# Patient Record
Sex: MALE | Race: OTHER | HISPANIC OR LATINO | Employment: UNEMPLOYED | URBAN - METROPOLITAN AREA
[De-identification: names, ages, dates, MRNs, and addresses within clinical notes are randomized per-mention and may not be internally consistent; named-entity substitution may affect disease eponyms.]

---

## 2021-02-16 ENCOUNTER — APPOINTMENT (EMERGENCY)
Dept: RADIOLOGY | Facility: HOSPITAL | Age: 31
End: 2021-02-16
Payer: COMMERCIAL

## 2021-02-16 ENCOUNTER — HOSPITAL ENCOUNTER (EMERGENCY)
Facility: HOSPITAL | Age: 31
Discharge: HOME | End: 2021-02-16
Attending: EMERGENCY MEDICINE | Admitting: EMERGENCY MEDICINE
Payer: COMMERCIAL

## 2021-02-16 VITALS
DIASTOLIC BLOOD PRESSURE: 63 MMHG | OXYGEN SATURATION: 100 % | WEIGHT: 134 LBS | TEMPERATURE: 98.2 F | HEART RATE: 65 BPM | RESPIRATION RATE: 17 BRPM | SYSTOLIC BLOOD PRESSURE: 123 MMHG

## 2021-02-16 DIAGNOSIS — R07.89 ATYPICAL CHEST PAIN: ICD-10-CM

## 2021-02-16 DIAGNOSIS — R53.83 FATIGUE: Primary | ICD-10-CM

## 2021-02-16 LAB
ANION GAP SERPL CALCULATED.3IONS-SCNC: 6 MMOL/L (ref 4–13)
ATRIAL RATE: 56 BPM
ATRIAL RATE: 66 BPM
BASOPHILS # BLD AUTO: 0.02 THOUSANDS/ΜL (ref 0–0.1)
BASOPHILS NFR BLD AUTO: 0 % (ref 0–1)
BUN SERPL-MCNC: 12 MG/DL (ref 5–25)
CALCIUM SERPL-MCNC: 9.7 MG/DL (ref 8.3–10.1)
CHLORIDE SERPL-SCNC: 104 MMOL/L (ref 100–108)
CO2 SERPL-SCNC: 30 MMOL/L (ref 21–32)
CREAT SERPL-MCNC: 1.23 MG/DL (ref 0.6–1.3)
EOSINOPHIL # BLD AUTO: 0.07 THOUSAND/ΜL (ref 0–0.61)
EOSINOPHIL NFR BLD AUTO: 1 % (ref 0–6)
ERYTHROCYTE [DISTWIDTH] IN BLOOD BY AUTOMATED COUNT: 12.4 % (ref 11.6–15.1)
GFR SERPL CREATININE-BSD FRML MDRD: 78 ML/MIN/1.73SQ M
GLUCOSE SERPL-MCNC: 86 MG/DL (ref 65–140)
HCT VFR BLD AUTO: 44.7 % (ref 36.5–49.3)
HGB BLD-MCNC: 14.5 G/DL (ref 12–17)
IMM GRANULOCYTES # BLD AUTO: 0.01 THOUSAND/UL (ref 0–0.2)
IMM GRANULOCYTES NFR BLD AUTO: 0 % (ref 0–2)
LYMPHOCYTES # BLD AUTO: 2.44 THOUSANDS/ΜL (ref 0.6–4.47)
LYMPHOCYTES NFR BLD AUTO: 47 % (ref 14–44)
MCH RBC QN AUTO: 28.7 PG (ref 26.8–34.3)
MCHC RBC AUTO-ENTMCNC: 32.4 G/DL (ref 31.4–37.4)
MCV RBC AUTO: 89 FL (ref 82–98)
MONOCYTES # BLD AUTO: 0.54 THOUSAND/ΜL (ref 0.17–1.22)
MONOCYTES NFR BLD AUTO: 10 % (ref 4–12)
NEUTROPHILS # BLD AUTO: 2.22 THOUSANDS/ΜL (ref 1.85–7.62)
NEUTS SEG NFR BLD AUTO: 42 % (ref 43–75)
NRBC BLD AUTO-RTO: 0 /100 WBCS
P AXIS: 63 DEGREES
P AXIS: 71 DEGREES
PLATELET # BLD AUTO: 234 THOUSANDS/UL (ref 149–390)
PMV BLD AUTO: 10.1 FL (ref 8.9–12.7)
POTASSIUM SERPL-SCNC: 4.2 MMOL/L (ref 3.5–5.3)
PR INTERVAL: 162 MS
PR INTERVAL: 170 MS
QRS AXIS: 82 DEGREES
QRS AXIS: 83 DEGREES
QRSD INTERVAL: 92 MS
QRSD INTERVAL: 96 MS
QT INTERVAL: 384 MS
QT INTERVAL: 398 MS
QTC INTERVAL: 384 MS
QTC INTERVAL: 402 MS
RBC # BLD AUTO: 5.05 MILLION/UL (ref 3.88–5.62)
SODIUM SERPL-SCNC: 140 MMOL/L (ref 136–145)
T WAVE AXIS: 69 DEGREES
T WAVE AXIS: 69 DEGREES
TROPONIN I SERPL-MCNC: <0.02 NG/ML
TSH SERPL DL<=0.05 MIU/L-ACNC: 0.68 UIU/ML (ref 0.36–3.74)
VENTRICULAR RATE: 56 BPM
VENTRICULAR RATE: 66 BPM
WBC # BLD AUTO: 5.3 THOUSAND/UL (ref 4.31–10.16)

## 2021-02-16 PROCEDURE — 80048 BASIC METABOLIC PNL TOTAL CA: CPT | Performed by: EMERGENCY MEDICINE

## 2021-02-16 PROCEDURE — 84443 ASSAY THYROID STIM HORMONE: CPT | Performed by: EMERGENCY MEDICINE

## 2021-02-16 PROCEDURE — 84484 ASSAY OF TROPONIN QUANT: CPT | Performed by: EMERGENCY MEDICINE

## 2021-02-16 PROCEDURE — 93005 ELECTROCARDIOGRAM TRACING: CPT

## 2021-02-16 PROCEDURE — 93010 ELECTROCARDIOGRAM REPORT: CPT | Performed by: INTERNAL MEDICINE

## 2021-02-16 PROCEDURE — 99284 EMERGENCY DEPT VISIT MOD MDM: CPT

## 2021-02-16 PROCEDURE — 85025 COMPLETE CBC W/AUTO DIFF WBC: CPT | Performed by: EMERGENCY MEDICINE

## 2021-02-16 PROCEDURE — 71046 X-RAY EXAM CHEST 2 VIEWS: CPT

## 2021-02-16 PROCEDURE — 36415 COLL VENOUS BLD VENIPUNCTURE: CPT | Performed by: EMERGENCY MEDICINE

## 2021-02-16 PROCEDURE — 99285 EMERGENCY DEPT VISIT HI MDM: CPT | Performed by: EMERGENCY MEDICINE

## 2021-02-16 NOTE — DISCHARGE INSTRUCTIONS
The cause of your symptoms is unclear, but unlikely to be an emergency at this time  It is important that you follow up as instructed and watch for any changes in your condition       You should return to the nearest emergency department, if you develop different type of chest pain, chest pain that is occurring more frequently, become much more short of breath when you lay flat, or are concerned about anything else

## 2021-02-16 NOTE — ED PROVIDER NOTES
Emergency Department Note- Rj Rojas 31 y o  male MRN: [id number]    Unit/Bed#: ED 27 Encounter: [id number]        History of Present Illness     Patient is a 48-year-old male accompanied by his mother  On February 5th the patient noticed he had some unusual sensations in his chest, predominantly left-hand side, felt like a bit of a pressure at times, then felt like something was moving around a little bit  Also began having some generalized feeling of being more fatigued and tired, having a little bit difficulty concentrating  Also felt like some occasional nausea but no actual vomiting  Also feeling a little bit short of breath at times but not consistently  No cough  At that point there was no travel history, no sick contacts, no recent hospitalizations or immobilizations  Some occasional loose stool but not consistently  Also some occasional difficulty stooling but not consistently  He had no fever, no chills, no focal weakness  He traveled to hospitals 3 days later, was in [facility] for 3 days, and then returned  He says while he was in [facility] and after returning his symptoms have continued, not really getting worse but not really getting better  He says he thinks he may have lost a little bit weight but does not weigh himself but thinks he looks a little bit thinner in the mirror  Four days ago he went to an emergency department in [place], was told that blood work and chest x-ray was unremarkable  COVID testing was negative  He presents today because his symptoms are still continuing and he wants to know what is wrong with him  He does not have a primary care physician however made an appointment with a new primary care physician to be seen tomorrow      Family history is negative for coronary disease, DVT, PE and connective tissue disorders such as Marfan's    REVIEW OF SYSTEMS     Constitutional:  As per HPI   Eyes:  No visual changes   ENT:  No tinnitus or hearing changes Cardiac: As per HPI   Respiratory:  As per HPI   Abdominal:  No nausea or vomiting   Urinary: No dysuria or hematuria   Hematologic: No easy bruising or bleeding   Skin: No rash   Musculoskeletal:  As per HPI   Neurologic:  As per HPI   Psychiatric: No mood changes      Historical Information   History reviewed  No pertinent past medical history  History reviewed  No pertinent surgical history  Social History   Social History     Substance and Sexual Activity   Alcohol Use Not Currently     Social History     Substance and Sexual Activity   Drug Use Yes    Types: Marijuana     Social History     Tobacco Use   Smoking Status Never Smoker   Smokeless Tobacco Never Used     Family History: History reviewed  No pertinent family history  MEDICATIONS:  Denies  ALLERGIES:  No Known Allergies    Vitals:    02/16/21 1133 02/16/21 1233   BP: 98/50 123/63   Pulse: 64 65   Resp: 18 17   Patient Position - Orthostatic VS: Lying Lying   Temp: 98 2 °F (36 8 °C)        PHYSICAL EXAM    General:  Patient is well-appearing  Head:  Atraumatic  Eyes:  Conjunctiva pink, Extraocular muscle intact, PERRL  ENT:  Mucous membranes are moist  Neck:  Supple  Cardiac:  S1-S2, without murmurs  Lungs:  Clear to auscultation bilaterally  Abdomen:  Soft, nontender, normal bowel sounds, no CVA tenderness, no tympany, no rigidity, no guarding  Extremities:  Normal range of motion, no pedal edema or calf asymmetry  Neurologic:  Awake, fluent speech, normal comprehension  AAOx3  Cranial nerves 2-12 are intact, strength is 5/5 in the bilateral upper & lower extremities, no slurred speech, no facial droop, no deficit on finger-to-nose testing, no pronator drift    Sensation to light touch is equal and symmetric throughout the whole body  Skin:  Pink warm and dry, no rash  Psychiatric:  Alert, pleasant, cooperative            Labs Reviewed   CBC AND DIFFERENTIAL - Abnormal       Result Value Ref Range Status    WBC 5 30  4 31 - 10 16 Thousand/uL Final    RBC 5 05  3 88 - 5 62 Million/uL Final    Hemoglobin 14 5  12 0 - 17 0 g/dL Final    Hematocrit 44 7  36 5 - 49 3 % Final    MCV 89  82 - 98 fL Final    MCH 28 7  26 8 - 34 3 pg Final    MCHC 32 4  31 4 - 37 4 g/dL Final    RDW 12 4  11 6 - 15 1 % Final    MPV 10 1  8 9 - 12 7 fL Final    Platelets 853  301 - 390 Thousands/uL Final    nRBC 0  /100 WBCs Final    Neutrophils Relative 42 (*) 43 - 75 % Final    Immat GRANS % 0  0 - 2 % Final    Lymphocytes Relative 47 (*) 14 - 44 % Final    Monocytes Relative 10  4 - 12 % Final    Eosinophils Relative 1  0 - 6 % Final    Basophils Relative 0  0 - 1 % Final    Neutrophils Absolute 2 22  1 85 - 7 62 Thousands/µL Final    Immature Grans Absolute 0 01  0 00 - 0 20 Thousand/uL Final    Lymphocytes Absolute 2 44  0 60 - 4 47 Thousands/µL Final    Monocytes Absolute 0 54  0 17 - 1 22 Thousand/µL Final    Eosinophils Absolute 0 07  0 00 - 0 61 Thousand/µL Final    Basophils Absolute 0 02  0 00 - 0 10 Thousands/µL Final   TSH, 3RD GENERATION WITH FREE T4 REFLEX - Normal    TSH 3RD GENERATON 0 678  0 358 - 3 740 uIU/mL Final    Narrative:     Patients undergoing fluorescein dye angiography may retain small amounts of fluorescein in the body for 48-72 hours post procedure  Samples containing fluorescein can produce falsely depressed TSH values  If the patient had this procedure,a specimen should be resubmitted post fluorescein clearance  TROPONIN I - Normal    Troponin I <0 02  <=0 04 ng/mL Final    Comment: 3Autovalidation override  [employer] Chemistry analyzer 99% cutoff is > 0 04 ng/mL in network labs     o cTnI 99% cutoff is useful only when applied to patients in the clinical setting of myocardial ischemia   o cTnI 99% cutoff should be interpreted in the context of clinical history, ECG findings and possibly cardiac imaging to establish correct diagnosis     o cTnI 99% cutoff may be suggestive but clearly not indicative of a coronary event without the clinical setting of myocardial ischemia  BASIC METABOLIC PANEL    Sodium 130  136 - 145 mmol/L Final    Potassium 4 2  3 5 - 5 3 mmol/L Final    Chloride 104  100 - 108 mmol/L Final    CO2 30  21 - 32 mmol/L Final    ANION GAP 6  4 - 13 mmol/L Final    BUN 12  5 - 25 mg/dL Final    Creatinine 1 23  0 60 - 1 30 mg/dL Final    Comment: Standardized to IDMS reference method    Glucose 86  65 - 140 mg/dL Final    Comment: If the patient is fasting, the ADA then defines impaired fasting glucose as > 100 mg/dL and diabetes as > or equal to 123 mg/dL  Specimen collection should occur prior to Sulfasalazine administration due to the potential for falsely depressed results  Specimen collection should occur prior to Sulfapyridine administration due to the potential for falsely elevated results  Calcium 9 7  8 3 - 10 1 mg/dL Final    eGFR 78  ml/min/1 73sq m Final    Narrative:     [place] guidelines for Chronic Kidney Disease (CKD):     Stage 1 with normal or high GFR (GFR > 90 mL/min/1 73 square meters)    Stage 2 Mild CKD (GFR = 60-89 mL/min/1 73 square meters)    Stage 3A Moderate CKD (GFR = 45-59 mL/min/1 73 square meters)    Stage 3B Moderate CKD (GFR = 30-44 mL/min/1 73 square meters)    Stage 4 Severe CKD (GFR = 15-29 mL/min/1 73 square meters)    Stage 5 End Stage CKD (GFR <15 mL/min/1 73 square meters)  Note: GFR calculation is accurate only with a steady state creatinine       Medications - No data to display    XR chest pa & lateral   ED Interpretation   Chest x-ray interpreted me shows no acute cardiopulmonary disease, no infiltrate, no effusion, no old available for comparison          ED Course as of Feb 16 1321   Tue Feb 16, 2021   1314 EKG interpreted by me, sinus rhythm, rate of 66, no ischemic or infarct changes, no ST segment elevation or depression, no old available for comparison      1314 On reassessment, there is no change with the above findings  Assessment/Plan     ED Medical Decision Making:    HEART Risk Score      Most Recent Value   Heart Score Risk Calculator   History  0 Filed at: 02/16/2021 1320   ECG  0 Filed at: 02/16/2021 1320   Age  0 Filed at: 02/16/2021 1320   Risk Factors  0 Filed at: 02/16/2021 1320   Troponin  0 Filed at: 02/16/2021 1320   HEART Score  0 Filed at: 02/16/2021 1320            The cause the patient's symptoms is unclear but unlikely to represent an acute emergency  His symptoms started 3 days before his travel,I do not believe this patient's complaints are from pulmonary embolism and I believe they would most likely be harmed through false positive test results and other complications of testing by further pursuing the diagnosis of pulmonary embolism  Do not believe this represents acute coronary syndrome  While the cause of the patient's complaints is most likely benign, it is possible that this is the early presentation of a more serious condition  This diagnostic uncertainty was discussed with the patient, the importance of follow up care, as well as the need to return to immediately return to the closest emergency department for concerning signs and symptoms  The patient stated they were aware of this diagnostic uncertainty, understood the importance of follow up and were comfortable being discharged  I believe that discharge home with outpatient follow up for further evaluation is medically appropriate  Supportive care, importance of follow-up and return precautions were discussed with the patient, who expressed understanding                  Time reflects when diagnosis was documented in both MDM as applicable and the Disposition within this note     Time User Action Codes Description Comment    2/16/2021  1:19 PM Charlene Crane [R53 [id number]] Fatigue     2/16/2021  1:19 PM Charlene Stevenson Add [R07 [id number]] Atypical chest pain       ED Disposition     ED Disposition Condition Date/Time Comment    Discharge Stable Tue Feb 16, 2021 1:21 PM Rosalba Bailey discharge to home/self care              Follow-up Information     Follow up With Specialties Details Why Contact Info    Your doctor  Go in 1 day Keep your scheduled appointment           New Prescriptions    No medications on file            Esau Orr, DO  02/16/21 [address] [place] [address], DO  02/16/21 1322

## 2021-02-17 ENCOUNTER — OFFICE VISIT (OUTPATIENT)
Dept: FAMILY MEDICINE CLINIC | Facility: CLINIC | Age: 31
End: 2021-02-17
Payer: COMMERCIAL

## 2021-02-17 VITALS
BODY MASS INDEX: 21.79 KG/M2 | HEIGHT: 67 IN | DIASTOLIC BLOOD PRESSURE: 60 MMHG | TEMPERATURE: 97.8 F | SYSTOLIC BLOOD PRESSURE: 110 MMHG | WEIGHT: 138.8 LBS | OXYGEN SATURATION: 99 % | HEART RATE: 65 BPM

## 2021-02-17 DIAGNOSIS — R53.83 FATIGUE, UNSPECIFIED TYPE: ICD-10-CM

## 2021-02-17 DIAGNOSIS — F41.9 ANXIETY: Primary | ICD-10-CM

## 2021-02-17 DIAGNOSIS — R07.9 CHEST PAIN, UNSPECIFIED TYPE: ICD-10-CM

## 2021-02-17 DIAGNOSIS — E56.9 VITAMIN DEFICIENCY: ICD-10-CM

## 2021-02-17 PROCEDURE — 99204 OFFICE O/P NEW MOD 45 MIN: CPT | Performed by: FAMILY MEDICINE

## 2021-02-17 RX ORDER — HYDROXYZINE PAMOATE 50 MG/1
50 CAPSULE ORAL 3 TIMES DAILY PRN
Qty: 30 CAPSULE | Refills: 0 | Status: SHIPPED | OUTPATIENT
Start: 2021-02-17

## 2021-02-17 NOTE — ASSESSMENT & PLAN NOTE
Chest discomfort does not appear cardiac in origin  EKG, chest Xray and blood work within normal limits  Patient's symptoms likely exacerbated by recent anxiety and stress  Continue to monitor symptoms and go to the ED if symptoms worsen

## 2021-02-17 NOTE — ASSESSMENT & PLAN NOTE
Patient under a lot of anxiety and stress which may be contributing to his symptoms  Addressed self-care methods for lessening anxiety such as decreased alcohol intake, decreased caffeine intake, healthy diet, stress management, and relaxation techniques  Patient referred to Psychology for cognitive behavioral therapy  Patient is to keep a daily log of stress precipitators, responses, and alleviators  Please start medication which was sent to your pharmacy  Side effects of the medication were discussed with the patient, which include drowsiness  If you experience suicidal ideations, go to the ED immediately  Follow up in 1 month for assessment of anxiety and adjustment of medication if needed

## 2021-02-17 NOTE — ASSESSMENT & PLAN NOTE
Patient was counseled on the importance of healthy diet and exercise  Avoid caffeine intake  · Exercise: Stressed the importance of regular exercise       150 minutes of cardiovascular exercise per week encouraged   · Nutrition: Stressed importance of moderation in sodium/caffeine intake, saturated fat and cholesterol, caloric balance, sufficient intake of fresh fruits, vegetables, fiber

## 2021-02-17 NOTE — PROGRESS NOTES
Assessment/Plan:    1  Anxiety  Assessment & Plan:  Patient under a lot of anxiety and stress which may be contributing to his symptoms  Addressed self-care methods for lessening anxiety such as decreased alcohol intake, decreased caffeine intake, healthy diet, stress management, and relaxation techniques  Patient referred to Psychology for cognitive behavioral therapy  Patient is to keep a daily log of stress precipitators, responses, and alleviators  Please start medication which was sent to your pharmacy  Side effects of the medication were discussed with the patient, which include drowsiness  If you experience suicidal ideations, go to the ED immediately  Follow up in 1 month for assessment of anxiety and adjustment of medication if needed  Orders:  -     Ambulatory referral to behavioral health therapists; Future  -     hydrOXYzine pamoate (VISTARIL) 50 mg capsule; Take 1 capsule (50 mg total) by mouth 3 (three) times a day as needed for anxiety    2  Chest pain, unspecified type  Assessment & Plan:  Chest discomfort does not appear cardiac in origin  EKG, chest Xray and blood work within normal limits  Patient's symptoms likely exacerbated by recent anxiety and stress  Continue to monitor symptoms and go to the ED if symptoms worsen  Orders:  -     Lipid Panel with Direct LDL reflex; Future; Expected date: 02/17/2021  -     GARETH Screen w/ Reflex to Titer/Pattern; Future; Expected date: 02/17/2021    3  Fatigue, unspecified type  Assessment & Plan:  Patient was counseled on the importance of healthy diet and exercise  Avoid caffeine intake  · Exercise: Stressed the importance of regular exercise       150 minutes of cardiovascular exercise per week encouraged   · Nutrition: Stressed importance of moderation in sodium/caffeine intake, saturated fat and cholesterol, caloric balance, sufficient intake of fresh fruits, vegetables, fiber       Orders:  -     GARETH Screen w/ Reflex to Titer/Pattern; Future; Expected date: 02/17/2021  -     Ricci-Barr virus early antigen antibody, IgG; Future; Expected date: 02/17/2021  -     Lyme Antibody Profile with reflex to WB; Future    4  Vitamin deficiency  Assessment & Plan:  Check blood work and start vitamin supplements     Orders:  -     Vitamin D 25 hydroxy; Future  -     Vitamin B12; Future; Expected date: 02/17/2021      Subjective:      Patient ID: Carly Reese is a 31 y o  male  HPI    Patient with no significant PMHx is presenting to [facility] and with complaint of feeling unwell  He went to the ED on 2/11 and on 2/16 for chest pain and fatigue  Blood work was within normal limits, chest Xray was normal, and EKG was normal as well  Patient complains of general feeling on unwell  He states that his chest pain is not truly pain, but is more of discomfort and dull pressure in his left chest   It occasionally radiates to his neck, but does not radiate to his back  He denies recent accident or trauma  The patient states that the feels of chest pressure comes on like a sudden wave  He feels onset of anxiety and like he can not do anything for at least 30 minutes until this feeling passes  These symptoms started 2 weeks ago, right before he travelled to [address] [place] [address]  Patient admits that he has been going through a lot of stress in his life recently  His younger brother is 29years old but he has physical and mental disabilities  He has not been well and is not aware that he needs help and treatment  His family is worried that his brother may hurt himself and that he may have mental illness  However, they do not know how to get him help  This is causing a lot of stress on their mother  His mother turns to the patient for emotional comfort and unloads her stress on the patient  This is causing him distress and more anxiety  He feels there is nothing he can do to help his brother and mother    She calls him 10-15 times throughout the day, and this causes the patient to feel overwhelmed  Patient was very active and was exercising regularly  He was going to the gym at least twice a week  He admits that his diet is not good  Patient is eating a lot of sweets and is eating mostly junk food and take out meals  He was drinking 3-4 cups of coffee malone, but since he started feeling unwell he has been cutting out coffee  The caffeine was causing him to feel anxious  He has a girlfriend who he trusts and confides him  She is present at today's visit and expresses her concern over the patient's well being  The patient is self-employed as a musician  The following portions of the patient's history were reviewed and updated as appropriate: allergies, current medications, past family history, past medical history, past social history, past surgical history, and problem list       Current Outpatient Medications:     hydrOXYzine pamoate (VISTARIL) 50 mg capsule, Take 1 capsule (50 mg total) by mouth 3 (three) times a day as needed for anxiety, Disp: 30 capsule, Rfl: 0      Review of Systems   Constitutional: Negative for appetite change, chills, fatigue and fever  HENT: Negative for congestion, ear discharge, ear pain, postnasal drip, rhinorrhea, sinus pressure, sinus pain, sneezing, sore throat and trouble swallowing  Eyes: Negative for pain, discharge, redness, itching and visual disturbance  Respiratory: Positive for chest tightness  Negative for apnea, cough, shortness of breath and wheezing  Cardiovascular: Positive for chest pain  Negative for leg swelling  Gastrointestinal: Negative for abdominal distention, abdominal pain, blood in stool, constipation, diarrhea, nausea and vomiting  Endocrine: Negative for polyuria  Genitourinary: Negative for decreased urine volume, difficulty urinating, discharge, dysuria, flank pain, frequency, hematuria, penile pain, penile swelling, scrotal swelling, testicular pain and urgency  Musculoskeletal: Negative for arthralgias, back pain, gait problem, joint swelling, myalgias, neck pain and neck stiffness  Skin: Negative for rash  Neurological: Negative for dizziness, weakness, light-headedness, numbness and headaches  Psychiatric/Behavioral: Positive for sleep disturbance  Negative for agitation, behavioral problems, dysphoric mood, self-injury and suicidal ideas  The patient is nervous/anxious  All other systems reviewed and are negative  Objective:      /60 (BP Location: Left arm, Patient Position: Sitting, Cuff Size: Large)   Pulse 65   Temp 97 8 °F (36 6 °C)   Ht 5' 7" (1 702 m)   Wt 63 kg (138 lb 12 8 oz)   SpO2 99%   BMI 21 74 kg/m²          Physical Exam  Vitals signs and nursing note reviewed  Constitutional:       General: He is not in acute distress  Appearance: Normal appearance  He is not ill-appearing or diaphoretic  HENT:      Head: Normocephalic and atraumatic  Right Ear: External ear normal  There is no impacted cerumen  Left Ear: External ear normal  There is no impacted cerumen  Nose: Nose normal       Mouth/Throat:      Mouth: Mucous membranes are moist       Pharynx: Oropharynx is clear  No oropharyngeal exudate or posterior oropharyngeal erythema  Eyes:      General: No scleral icterus  Extraocular Movements: Extraocular movements intact  Conjunctiva/sclera: Conjunctivae normal       Pupils: Pupils are equal, round, and reactive to light  Neck:      Musculoskeletal: Full passive range of motion without pain and normal range of motion  Thyroid: No thyromegaly  Vascular: No carotid bruit  Cardiovascular:      Rate and Rhythm: Normal rate and regular rhythm  Heart sounds: Normal heart sounds  No murmur  Pulmonary:      Effort: Pulmonary effort is normal  No accessory muscle usage or respiratory distress  Breath sounds: Normal breath sounds and air entry     Abdominal:      General: Bowel sounds are normal  There is no distension  Palpations: Abdomen is soft  There is no mass  Tenderness: There is no abdominal tenderness  Musculoskeletal: Normal range of motion  Right lower leg: No edema  Left lower leg: No edema  Lymphadenopathy:      Cervical: No cervical adenopathy  Skin:     General: Skin is warm and dry  Neurological:      General: No focal deficit present  Mental Status: He is alert and oriented to person, place, and time  Cranial Nerves: No cranial nerve deficit  Psychiatric:         Mood and Affect: Mood normal          Behavior: Behavior normal          Thought Content:  Thought content normal          Judgment: Judgment normal

## 2021-03-02 ENCOUNTER — LAB (OUTPATIENT)
Dept: LAB | Facility: CLINIC | Age: 31
End: 2021-03-02
Payer: COMMERCIAL

## 2021-03-02 DIAGNOSIS — R53.83 FATIGUE, UNSPECIFIED TYPE: ICD-10-CM

## 2021-03-02 DIAGNOSIS — R07.9 CHEST PAIN, UNSPECIFIED TYPE: ICD-10-CM

## 2021-03-02 DIAGNOSIS — E56.9 VITAMIN DEFICIENCY: ICD-10-CM

## 2021-03-02 LAB — 25(OH)D3 SERPL-MCNC: 28 NG/ML (ref 30–100)

## 2021-03-02 PROCEDURE — 36415 COLL VENOUS BLD VENIPUNCTURE: CPT

## 2021-03-02 PROCEDURE — 82306 VITAMIN D 25 HYDROXY: CPT

## 2021-03-02 PROCEDURE — 82607 VITAMIN B-12: CPT

## 2021-03-02 PROCEDURE — 86663 EPSTEIN-BARR ANTIBODY: CPT

## 2021-03-02 PROCEDURE — 86618 LYME DISEASE ANTIBODY: CPT

## 2021-03-02 PROCEDURE — 80061 LIPID PANEL: CPT

## 2021-03-02 PROCEDURE — 86038 ANTINUCLEAR ANTIBODIES: CPT

## 2021-03-03 LAB
B BURGDOR IGG+IGM SER-ACNC: 35
CHOLEST SERPL-MCNC: 125 MG/DL (ref 50–200)
EBV EA IGG SER-ACNC: <9 U/ML (ref 0–8.9)
HDLC SERPL-MCNC: 38 MG/DL
LDLC SERPL CALC-MCNC: 62 MG/DL (ref 0–100)
RYE IGE QN: NEGATIVE
TRIGL SERPL-MCNC: 125 MG/DL
VIT B12 SERPL-MCNC: 331 PG/ML (ref 100–900)